# Patient Record
Sex: FEMALE | Race: BLACK OR AFRICAN AMERICAN | Employment: OTHER | URBAN - METROPOLITAN AREA
[De-identification: names, ages, dates, MRNs, and addresses within clinical notes are randomized per-mention and may not be internally consistent; named-entity substitution may affect disease eponyms.]

---

## 2017-11-05 ENCOUNTER — HOSPITAL ENCOUNTER (EMERGENCY)
Age: 42
Discharge: HOME | End: 2017-11-05
Attending: EMERGENCY MEDICINE
Payer: COMMERCIAL

## 2017-11-05 ENCOUNTER — APPOINTMENT (OUTPATIENT)
Dept: GENERAL RADIOLOGY | Age: 42
End: 2017-11-05
Attending: PHYSICIAN ASSISTANT
Payer: COMMERCIAL

## 2017-11-05 VITALS
SYSTOLIC BLOOD PRESSURE: 116 MMHG | DIASTOLIC BLOOD PRESSURE: 73 MMHG | RESPIRATION RATE: 16 BRPM | TEMPERATURE: 98.6 F | HEART RATE: 80 BPM | OXYGEN SATURATION: 100 %

## 2017-11-05 DIAGNOSIS — S16.1XXA STRAIN OF NECK MUSCLE, INITIAL ENCOUNTER: Primary | ICD-10-CM
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
## 2017-11-05 PROCEDURE — 74011250637 HC RX REV CODE- 250/637: Performed by: PHYSICIAN ASSISTANT

## 2017-11-05 PROCEDURE — 72040 X-RAY EXAM NECK SPINE 2-3 VW: CPT

## 2017-11-05 PROCEDURE — 99283 EMERGENCY DEPT VISIT LOW MDM: CPT

## 2017-11-05 RX ORDER — IBUPROFEN 400 MG/1
800 TABLET ORAL
Status: COMPLETED | OUTPATIENT
Start: 2017-11-05 | End: 2017-11-05

## 2017-11-05 RX ORDER — CYCLOBENZAPRINE HCL 10 MG
10 TABLET ORAL
Qty: 12 TAB | Refills: 0 | Status: SHIPPED | OUTPATIENT
Start: 2017-11-05

## 2017-11-05 RX ORDER — NAPROXEN 500 MG/1
500 TABLET ORAL 2 TIMES DAILY WITH MEALS
Qty: 20 TAB | Refills: 0 | Status: SHIPPED | OUTPATIENT
Start: 2017-11-05 | End: 2017-11-15

## 2017-11-05 RX ADMIN — IBUPROFEN 800 MG: 400 TABLET ORAL at 17:22
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
## 2017-11-05 NOTE — DISCHARGE INSTRUCTIONS
Neck Strain: Care Instructions  Your Care Instructions    You have strained the muscles and ligaments in your neck. A sudden, awkward movement can strain the neck. This often occurs with falls or car accidents or during certain sports. Everyday activities like working on a computer or sleeping can also cause neck strain if they force you to hold your neck in an awkward position for a long time. It is common for neck pain to get worse for a day or two after an injury, but it should start to feel better after that. You may have more pain and stiffness for several days before it gets better. This is expected. It may take a few weeks or longer for it to heal completely. Good home treatment can help you get better faster and avoid future neck problems. Follow-up care is a key part of your treatment and safety. Be sure to make and go to all appointments, and call your doctor if you are having problems. It's also a good idea to know your test results and keep a list of the medicines you take. How can you care for yourself at home? · If you were given a neck brace (cervical collar) to limit neck motion, wear it as instructed for as many days as your doctor tells you to. Do not wear it longer than you were told to. Wearing a brace for too long can make neck stiffness worse and weaken the neck muscles. · You can try using heat or ice to see if it helps. ¨ Try using a heating pad on a low or medium setting for 15 to 20 minutes every 2 to 3 hours. Try a warm shower in place of one session with the heating pad. You can also buy single-use heat wraps that last up to 8 hours. ¨ You can also try an ice pack for 10 to 15 minutes every 2 to 3 hours. · Take pain medicines exactly as directed. ¨ If the doctor gave you a prescription medicine for pain, take it as prescribed. ¨ If you are not taking a prescription pain medicine, ask your doctor if you can take an over-the-counter medicine.   · Gently rub the area to relieve pain and help with blood flow. Do not massage the area if it hurts to do so. · Do not do anything that makes the pain worse. Take it easy for a couple of days. You can do your usual activities if they do not hurt your neck or put it at risk for more stress or injury. · Try sleeping on a special neck pillow. Place it under your neck, not under your head. Placing a tightly rolled-up towel under your neck while you sleep will also work. If you use a neck pillow or rolled towel, do not use your regular pillow at the same time. · To prevent future neck pain, do exercises to stretch and strengthen your neck and back. Learn how to use good posture, safe lifting techniques, and proper body mechanics. When should you call for help? Call 911 anytime you think you may need emergency care. For example, call if:  ? · You are unable to move an arm or a leg at all. ?Call your doctor now or seek immediate medical care if:  ? · You have new or worse symptoms in your arms, legs, chest, belly, or buttocks. Symptoms may include:  ¨ Numbness or tingling. ¨ Weakness. ¨ Pain. ? · You lose bladder or bowel control. ? Watch closely for changes in your health, and be sure to contact your doctor if:  ? · You are not getting better as expected. Where can you learn more? Go to http://martine-azar.info/. Enter M253 in the search box to learn more about \"Neck Strain: Care Instructions. \"  Current as of: March 21, 2017  Content Version: 11.4  © 2973-7417 [employer]. Care instructions adapted under license by [employer] (which disclaims liability or warranty for this information). If you have questions about a medical condition or this instruction, always ask your healthcare professional. [address] any warranty or liability for your use of this information.

## 2017-11-16 ENCOUNTER — OFFICE VISIT (OUTPATIENT)
Dept: FAMILY MEDICINE CLINIC | Age: 42
End: 2017-11-16

## 2017-11-16 VITALS
OXYGEN SATURATION: 98 % | DIASTOLIC BLOOD PRESSURE: 68 MMHG | HEART RATE: 78 BPM | SYSTOLIC BLOOD PRESSURE: 108 MMHG | HEIGHT: 63 IN | TEMPERATURE: 98.1 F | BODY MASS INDEX: 29.3 KG/M2 | RESPIRATION RATE: 14 BRPM | WEIGHT: 165.4 LBS

## 2017-11-16 DIAGNOSIS — M54.50 ACUTE BILATERAL LOW BACK PAIN WITHOUT SCIATICA: ICD-10-CM

## 2017-11-16 DIAGNOSIS — M54.2 NECK PAIN: Primary | ICD-10-CM

## 2017-11-16 RX ORDER — NAPROXEN 500 MG/1
500 TABLET ORAL 2 TIMES DAILY WITH MEALS
COMMUNITY

## 2017-11-16 NOTE — PATIENT INSTRUCTIONS
Healthy Upper Back: Exercises  Your Care Instructions  Here are some examples of exercises for your upper back. Start each exercise slowly. Ease off the exercise if you start to have pain. Your doctor or physical therapist will tell you when you can start these exercises and which ones will work best for you. How to do the exercises  Lower neck and upper back stretch    1. Stretch your arms out in front of your body. Clasp one hand on top of your other hand. 2. Gently reach out so that you feel your shoulder blades stretching away from each other. 3. Gently bend your head forward. 4. Hold for 15 to 30 seconds. 5. Repeat 2 to 4 times. Midback stretch    If you have knee pain, do not do this exercise. 1. Kneel on the floor, and sit back on your ankles. 2. Lean forward, place your hands on the floor, and stretch your arms out in front of you. Rest your head between your arms. 3. Gently push your chest toward the floor, reaching as far in front of you as possible. 4. Hold for 15 to 30 seconds. 5. Repeat 2 to 4 times. Shoulder rolls    1. Sit comfortably with your feet shoulder-width apart. You can also do this exercise while standing. 2. Roll your shoulders up, then back, and then down in a smooth, circular motion. 3. Repeat 2 to 4 times. Wall push-up    1. Stand against a wall with your feet about 12 to 24 inches back from the wall. If you feel any pain when you do this exercise, stand closer to the wall. 2. Place your hands on the wall slightly wider apart than your shoulders, and lean forward. 3. Gently lean your body toward the wall. Then push back to your starting position. Keep the motion smooth and controlled. 4. Repeat 8 to 12 times. Resisted shoulder blade squeeze    For this exercise, you will need elastic exercise material, such as surgical tubing or Thera-Band. 1. Sit or stand, holding the band in both hands in front of you.  Keep your elbows close to your sides, bent at a 90-degree angle. Your palms should face up. 2. Squeeze your shoulder blades together, and move your arms to the outside, stretching the band. Be sure to keep your elbows at your sides while you do this. 3. Relax. 4. Repeat 8 to 12 times. Resisted rows    For this exercise, you will need elastic exercise material, such as surgical tubing or Thera-Band. 1. Put the band around a solid object, such as a bedpost, at about waist level. Hold one end of the band in each hand. 2. With your elbows at your sides and bent to 90 degrees, pull the band back to move your shoulder blades toward each other. Return to the starting position. 3. Repeat 8 to 12 times. Follow-up care is a key part of your treatment and safety. Be sure to make and go to all appointments, and call your doctor if you are having problems. It's also a good idea to know your test results and keep a list of the medicines you take. Where can you learn more? Go to http://martine-azar.info/. Enter J818 in the search box to learn more about \"Healthy Upper Back: Exercises. \"  Current as of: March 21, 2017  Content Version: 11.4  © 2563-9491 [employer]. Care instructions adapted under license by [employer] (which disclaims liability or warranty for this information). If you have questions about a medical condition or this instruction, always ask your healthcare professional. [place] [address] any warranty or liability for your use of this information. Body Mass Index: Care Instructions  Your Care Instructions    Body mass index (BMI) can help you see if your weight is raising your risk for health problems. It uses a formula to compare how much you weigh with how tall you are. · A BMI lower than 18.5 is considered underweight. · A BMI between 18.5 and 24.9 is considered healthy. · A BMI between 25 and 29.9 is considered overweight. A BMI of 30 or higher is considered obese.   If your BMI is in the normal range, it means that you have a lower risk for weight-related health problems. If your BMI is in the overweight or obese range, you may be at increased risk for weight-related health problems, such as high blood pressure, heart disease, stroke, arthritis or joint pain, and diabetes. If your BMI is in the underweight range, you may be at increased risk for health problems such as fatigue, lower protection (immunity) against illness, muscle loss, bone loss, hair loss, and hormone problems. BMI is just one measure of your risk for weight-related health problems. You may be at higher risk for health problems if you are not active, you eat an unhealthy diet, or you drink too much alcohol or use tobacco products. Follow-up care is a key part of your treatment and safety. Be sure to make and go to all appointments, and call your doctor if you are having problems. It's also a good idea to know your test results and keep a list of the medicines you take. How can you care for yourself at home? · Practice healthy eating habits. This includes eating plenty of fruits, vegetables, whole grains, lean protein, and low-fat dairy. · If your doctor recommends it, get more exercise. Walking is a good choice. Bit by bit, increase the amount you walk every day. Try for at least 30 minutes on most days of the week. · Do not smoke. Smoking can increase your risk for health problems. If you need help quitting, talk to your doctor about stop-smoking programs and medicines. These can increase your chances of quitting for good. · Limit alcohol to 2 drinks a day for men and 1 drink a day for women. Too much alcohol can cause health problems. If you have a BMI higher than 25  · Your doctor may do other tests to check your risk for weight-related health problems.  This may include measuring the distance around your waist. A waist measurement of more than 40 inches in men or 35 inches in women can increase the risk of weight-related health problems. · Talk with your doctor about steps you can take to stay healthy or improve your health. You may need to make lifestyle changes to lose weight and stay healthy, such as changing your diet and getting regular exercise. If you have a BMI lower than 18.5  · Your doctor may do other tests to check your risk for health problems. · Talk with your doctor about steps you can take to stay healthy or improve your health. You may need to make lifestyle changes to gain or maintain weight and stay healthy, such as getting more healthy foods in your diet and doing exercises to build muscle. Where can you learn more? Go to http://martine-azar.info/. Enter S176 in the search box to learn more about \"Body Mass Index: Care Instructions. \"  Current as of: October 13, 2016  Content Version: 11.4  © 2655-4405 [employer]. Care instructions adapted under license by [employer] (which disclaims liability or warranty for this information). If you have questions about a medical condition or this instruction, always ask your healthcare professional. [address] any warranty or liability for your use of this information.
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
## 2017-11-16 NOTE — MR AVS SNAPSHOT
Visit Information Date & Time Provider Department Dept. Phone Encounter #  
 11/16/2017  2:15 PM Kalyan Fabian [facility] [phone] [id number] Follow-up Instructions Return if symptoms worsen or fail to improve. Upcoming Health Maintenance Date Due DTaP/Tdap/Td series (1 - Tdap) 1/29/1997 PAP AKA CERVICAL CYTOLOGY 1/29/1997 Influenza Age 5 to Adult 8/1/2017 Allergies as of 11/16/2017  Review Complete On: 11/16/2017 By: Kalyan Fabian MD  
 No Known Allergies Current Immunizations  Never Reviewed No immunizations on file. Not reviewed this visit You Were Diagnosed With   
  
 Codes Comments Neck pain    -  Primary ICD-10-CM: M54.2 ICD-9-CM: 723.1 Acute bilateral low back pain without sciatica     ICD-10-CM: M54.5 ICD-9-CM: 724.2, 338.19 Vitals BP Pulse Temp Resp Height(growth percentile) Weight(growth percentile) 108/68 (BP 1 Location: Left arm, BP Patient Position: Sitting) 78 98.1 °F (36.7 °C) (Oral) 14 5' 3\" (1.6 m) 165 lb 6.4 oz (75 kg) LMP SpO2 BMI OB Status Smoking Status 07/31/2015 98% 29.3 kg/m2 Hysterectomy Never Smoker Vitals History BMI and BSA Data Body Mass Index Body Surface Area  
 29.3 kg/m 2 1.83 m 2 Preferred Pharmacy Pharmacy Name Phone [facility] [address] [place] [address] [phone] Your Updated Medication List  
  
   
This list is accurate as of: 11/16/17  3:04 PM.  Always use your most recent med list.  
  
  
  
  
 cyclobenzaprine 10 mg tablet Commonly known as:  FLEXERIL Take 1 Tab by mouth three (3) times daily as needed for Muscle Spasm(s). naproxen 500 mg tablet Commonly known as:  NAPROSYN Take 500 mg by mouth two (2) times daily (with meals). We Performed the Following REFERRAL TO PHYSICAL THERAPY [FZE45 Custom] Comments: Please evaluate patient for neck and back pain Follow-up Instructions Return if symptoms worsen or fail to improve. Referral Information Referral ID Referred By Referred To  
  
 [id number] GUME Tracy Not Available Visits Status Start Date End Date 1 New Request 11/16/17 11/16/18 If your referral has a status of pending review or denied, additional information will be sent to support the outcome of this decision. Patient Instructions Healthy Upper Back: Exercises Your Care Instructions Here are some examples of exercises for your upper back. Start each exercise slowly. Ease off the exercise if you start to have pain. Your doctor or physical therapist will tell you when you can start these exercises and which ones will work best for you. How to do the exercises Lower neck and upper back stretch 1. Stretch your arms out in front of your body. Clasp one hand on top of your other hand. 2. Gently reach out so that you feel your shoulder blades stretching away from each other. 3. Gently bend your head forward. 4. Hold for 15 to 30 seconds. 5. Repeat 2 to 4 times. Midback stretch If you have knee pain, do not do this exercise. 1. Kneel on the floor, and sit back on your ankles. 2. Lean forward, place your hands on the floor, and stretch your arms out in front of you. Rest your head between your arms. 3. Gently push your chest toward the floor, reaching as far in front of you as possible. 4. Hold for 15 to 30 seconds. 5. Repeat 2 to 4 times. Shoulder rolls 1. Sit comfortably with your feet shoulder-width apart. You can also do this exercise while standing. 2. Roll your shoulders up, then back, and then down in a smooth, circular motion. 3. Repeat 2 to 4 times. Wall push-up 1. Stand against a wall with your feet about 12 to 24 inches back from the wall. If you feel any pain when you do this exercise, stand closer to the wall. 2. Place your hands on the wall slightly wider apart than your shoulders, and lean forward. 3. Gently lean your body toward the wall. Then push back to your starting position. Keep the motion smooth and controlled. 4. Repeat 8 to 12 times. Resisted shoulder blade squeeze For this exercise, you will need elastic exercise material, such as surgical tubing or Thera-Band. 1. Sit or stand, holding the band in both hands in front of you. Keep your elbows close to your sides, bent at a 90-degree angle. Your palms should face up. 2. Squeeze your shoulder blades together, and move your arms to the outside, stretching the band. Be sure to keep your elbows at your sides while you do this. 3. Relax. 4. Repeat 8 to 12 times. Resisted rows For this exercise, you will need elastic exercise material, such as surgical tubing or Thera-Band. 1. Put the band around a solid object, such as a bedpost, at about waist level. Hold one end of the band in each hand. 2. With your elbows at your sides and bent to 90 degrees, pull the band back to move your shoulder blades toward each other. Return to the starting position. 3. Repeat 8 to 12 times. Follow-up care is a key part of your treatment and safety. Be sure to make and go to all appointments, and call your doctor if you are having problems. It's also a good idea to know your test results and keep a list of the medicines you take. Where can you learn more? Go to http://martine-azar.info/. Enter U226 in the search box to learn more about \"Healthy Upper Back: Exercises. \" Current as of: March 21, 2017 Content Version: 11.4 © 4221-4284 [employer]. Care instructions adapted under license by [employer] (which disclaims liability or warranty for this information).  If you have questions about a medical condition or this instruction, always ask your healthcare professional. [employer] disclaims any warranty or liability for your use of this information. Introducing [facility]! [facility] introduces HobbyTalk patient portal. Now you can access parts of your medical record, email your doctor's office, and request medication refills online. 1. In your internet browser, go to [email or web address] 2. Click on the First Time User? Click Here link in the Sign In box. You will see the New Member Sign Up page. 3. Enter your HobbyTalk Access Code exactly as it appears below. You will not need to use this code after youve completed the sign-up process. If you do not sign up before the expiration date, you must request a new code. · HobbyTalk Access Code: [email or web address] Expires: 2/3/2018  5:59 PM 
 
4. Enter the last four digits of your Social Security Number (xxxx) and Date of Birth (mm/dd/yyyy) as indicated and click Submit. You will be taken to the next sign-up page. 5. Create a HobbyTalk ID. This will be your HobbyTalk login ID and cannot be changed, so think of one that is secure and easy to remember. 6. Create a HobbyTalk password. You can change your password at any time. 7. Enter your Password Reset Question and Answer. This can be used at a later time if you forget your password. 8. Enter your e-mail address. You will receive e-mail notification when new information is available in [address] 9. Click Sign Up. You can now view and download portions of your medical record. 10. Click the Download Summary menu link to download a portable copy of your medical information. If you have questions, please visit the Frequently Asked Questions section of the HobbyTalk website. Remember, HobbyTalk is NOT to be used for urgent needs. For medical emergencies, dial 911. Now available from your iPhone and Android! Please provide this summary of care documentation to your next provider. Your primary care clinician is listed as Phys Other. If you have any questions after today's visit, please call [phone].

## 2017-11-28 ENCOUNTER — HOSPITAL ENCOUNTER (OUTPATIENT)
Dept: PHYSICAL THERAPY | Age: 42
Discharge: HOME | End: 2017-11-28
Payer: COMMERCIAL

## 2017-11-28 PROCEDURE — 97162 PT EVAL MOD COMPLEX 30 MIN: CPT

## 2017-11-28 PROCEDURE — 97110 THERAPEUTIC EXERCISES: CPT
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
## 2017-11-30 ENCOUNTER — APPOINTMENT (OUTPATIENT)
Dept: PHYSICAL THERAPY | Age: 42
End: 2017-11-30

## 2017-12-04 ENCOUNTER — HOSPITAL ENCOUNTER (OUTPATIENT)
Dept: PHYSICAL THERAPY | Age: 42
Discharge: HOME | End: 2017-12-04
Payer: COMMERCIAL

## 2017-12-04 PROCEDURE — 97140 MANUAL THERAPY 1/> REGIONS: CPT

## 2017-12-04 PROCEDURE — 97110 THERAPEUTIC EXERCISES: CPT

## 2017-12-04 NOTE — PROGRESS NOTES
PT DAILY TREATMENT NOTE - [facility]     Patient Name: Temo Tran  Date:2017  : 1975  [x]  Patient  Verified  Payor: Jenniffer Buchanan / Plan: Monica Shen / Product Type: Commerical /    In time: 9:40   Out time: 10:29  Total Treatment Time (min): 49  Visit #: 2 of 10    Treatment Area: Neck pain [M54.2]    SUBJECTIVE  Pain Level (0-10 scale): 5  Any medication changes, allergies to medications, adverse drug reactions, diagnosis change, or new procedure performed?: [x] No    [] Yes (see summary sheet for update)  Subjective functional status/changes:   [] No changes reported  Reports attempting the HEP, otherwise no changes.     OBJECTIVE    Modality rationale: decrease pain and increase tissue extensibility to improve the patients ability to tolerate functional tasks    Min Type Additional Details    [] Estim:  []Unatt       []IFC  []Premod                        []Other:  []w/ice   []w/heat  Position:  Location:    [] Estim: []Att    []TENS instruct  []NMES                    []Other:  []w/US   []w/ice   []w/heat  Position:  Location:    []  Traction: [] Cervical       []Lumbar                       [] Prone          []Supine                       []Intermittent   []Continuous Lbs:  [] before manual  [] after manual    []  Ultrasound: []Continuous   [] Pulsed                           []1MHz   []3MHz Location:  W/cm2:    []  Iontophoresis with dexamethasone         Location: [] Take home patch   [] In clinic   10 []  Ice     [x]  heat  []  Ice massage  []  Laser   []  Anodyne Position: sitting  Location: neck    []  Laser with stim  []  Other: Position:  Location:    []  Vasopneumatic Device Pressure:       [] lo [] med [] hi   Temperature: [] lo [] med [] hi   [] Skin assessment post-treatment:  []intact []redness- no adverse reaction    []redness  adverse reaction:     29 min Therapeutic Exercise:  [x] See flow sheet :    Rationale: increase ROM and increase strength to improve the patients ability to tolerate ADLs    10 min Manual Therapy: PAs c/s grade 1-2 C3, TPR B c/s paraspinals, DTM right UT/scalenes, gentle manual c/s distraction for stretching   Rationale: decrease pain, increase ROM, increase tissue extensibility and decrease trigger points to improve activity tolerance. With   [] TE   [] TA   [] neuro   [] other: Patient Education: [x] Review HEP    [] Progressed/Changed HEP based on:   [] positioning   [] body mechanics   [] transfers   [] heat/ice application    [] other:      Other Objective/Functional Measures: Initiated exercises/interventions in flow sheet. Pain Level (0-10 scale) post treatment: 0 \"sore\"    ASSESSMENT/Changes in Function: Reported no pain post session. Decreased mobility noted at C3 and reported pain to the right c/s paraspinals in this region as well. Reported improvement in muscle tightness in the right neck and UT/scalenes region post manual. Continue POC as tolerated. Patient will continue to benefit from skilled PT services to modify and progress therapeutic interventions, address functional mobility deficits, address ROM deficits, address strength deficits, analyze and address soft tissue restrictions, analyze and cue movement patterns, analyze and modify body mechanics/ergonomics, assess and modify postural abnormalities and instruct in home and community integration to attain remaining goals. []  See Plan of Care  []  See progress note/recertification  []  See Discharge Summary         Progress towards goals / Updated goals:  Short Term Goals: To be accomplished in 2 weeks:  1. Pt will report compliance and independence to HEP to help the pt manage their pain and symptoms. Eval: established     Current: reports attempting HEP 12/4/2017          Long Term Goals: To be accomplished in 5 weeks:  1. Pt will increase FOTO score to 72 points to improve ability to perform ADLs. Eval: 59 points  2.  Pt will report a decrease in at worst pain to 5/10 to improve tolerance to work tasks. Eval: 8/10 at worst   3. Pt will increase AROM c/s flex to 35 degs, EXT to 40 degs, B SB to 25 degs, right rotation to 60 degs, left rotation to WNLs to improve ability to perform household activities with less pain. Eval: flex 22 degs with pain on right neck, EXT 28 degs with tightness right skull region, right SB 19 degs with tightness right skull region, left SB 21 degs, right rotation 48 degs with pain in the right neck, left rotation 62 degs. 4. Pt will report being able to look down for 10 mins with minimal to no increased neck pain to improve ability to perform work activities with less pain.   Eval: ~ 5 mins tolerance    PLAN  [x]  Upgrade activities as tolerated     [x]  Continue plan of care  [x]  Update interventions per flow sheet       []  Discharge due to:_  []  Other:_      Salazar Starkey PT 12/4/2017  9:51 AM    Future Appointments  Date Time Provider Hannah Palencia   12/4/2017 9:30 AM Salazar Starkey PT [facility]   12/7/2017 10:00 AM ANA Flores   12/11/2017 10:00 AM Ashutosh Corbett PTA [facility]   12/14/2017 10:30 AM ANA Flores

## 2017-12-07 ENCOUNTER — APPOINTMENT (OUTPATIENT)
Dept: PHYSICAL THERAPY | Age: 42
End: 2017-12-07
Payer: COMMERCIAL

## 2017-12-11 ENCOUNTER — APPOINTMENT (OUTPATIENT)
Dept: PHYSICAL THERAPY | Age: 42
End: 2017-12-11
Payer: COMMERCIAL

## 2017-12-13 ENCOUNTER — APPOINTMENT (OUTPATIENT)
Dept: PHYSICAL THERAPY | Age: 42
End: 2017-12-13
Payer: COMMERCIAL

## 2017-12-14 ENCOUNTER — APPOINTMENT (OUTPATIENT)
Dept: PHYSICAL THERAPY | Age: 42
End: 2017-12-14
Payer: COMMERCIAL

## 2018-01-02 ENCOUNTER — HOSPITAL ENCOUNTER (OUTPATIENT)
Dept: PHYSICAL THERAPY | Age: 43
Discharge: HOME | End: 2018-01-02
Payer: COMMERCIAL

## 2018-01-02 PROCEDURE — 97110 THERAPEUTIC EXERCISES: CPT

## 2018-01-02 PROCEDURE — 97140 MANUAL THERAPY 1/> REGIONS: CPT

## 2018-01-02 NOTE — PROGRESS NOTES
[facility] at [address] [facility] [address] [facility]  [address]  Phone: [phone]      Fax:  [phone]    Progress Note  Patient name: Reese Estrella Start of Care: 2017   Referral source: Ashton Spurling, MD : 1975                         Medical Diagnosis: Neck pain [M54.2] Onset Date: 2017                         Treatment Diagnosis: neck and back pain   Prior Hospitalization: see medical history Provider#: [id number]   Medications: Verified on Patient summary List    Comorbidities: none   Prior Level of Function: Independent with ADls, functional and work tasks with no pain in the back and neck. Visits from Start of Care: 2    Missed Visits: 0    Established Goals:          Excellent Good         Limited           None  [x] Increased ROM   []  [x]  []  []  [] Increased Strength  []  []  []  []  [x] Increased Mobility  []  []  [x]  []   [x] Decreased Pain   []  [x]  []  []  [] Decreased Swelling  []  []  []  []    Key Functional Changes:   Functional Gains: overall pain, ROM, some improvement with looking down, overall stiffness/achyness, pain in the back  Functional Deficits: still have pain/stiffness in the neck  Pain                   Best: 1-210                          Worst: 10    Current goals:  1. Pt will report compliance and independence to HEP to help the pt manage their pain and symptoms.             Eval: established     Current: reports attempting HEP 2017          Long Term Goals: To be accomplished in 5 weeks:  1. Pt will increase FOTO score to 72 points to improve ability to perform ADLs. Eval: 59 points  Current: progressing, 63 points 2018  2. Pt will report a decrease in at worst pain to 5/10 to improve tolerance to work tasks. Eval: 8/10 at worst   Current: MET 4/10 at worst 2018  3.  Pt will increase AROM c/s flex to 35 degs, EXT to 40 degs, B SB to 25 degs, right rotation to 60 degs, left rotation to WNLs to improve ability to perform household activities with less pain. Eval: flex 22 degs with pain on right neck, EXT 28 degs with tightness right skull region, right SB 19 degs with tightness right skull region, left SB 21 degs, right rotation 48 degs with pain in the right neck, left rotation 62 degs. Current: progressing, flex 25 degs with pulling at the back of the skull, EXT 32 degs, right SB 24 degs with stretching on the left neck, left SB 23 degs with tightness on the right neck, right rotation 64 degs with pulling in the right neck/jaw, left rotation WNL with tightness on the right neck 1/2/2018. 4. Pt will report being able to look down for 10 mins with minimal to no increased neck pain to improve ability to perform work activities with less pain. 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 Pt will increase FOTO score to 72 points to improve ability to perform ADLs. PN: progressing, 63 points   2. Pt will report a decrease in at at best pain in the neck to 0/10 to improve tolerance to work tasks. PN: 1-2/10 at best  3. Pt will increase AROM c/s flex to 35 degs, EXT to 40 degs, B SB to 25 degs, right rotation to [facility] improve ability to perform household activities with less pain. PN: flex 25 degs with pulling at the back of the skull, EXT 32 degs, right SB 24 degs with stretching on the left neck, left SB 23 degs with tightness on the right neck, right rotation 64 degs with pulling in the right neck/jaw  4. Pt will report being able to look down for 10 mins with minimal to no increased neck pain to improve ability to perform work activities with less pain.   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 Pt reports that her back pain is not an issue at this time and her current pain is mostly in her neck region. We will continue therapy at this time to further improve activity tolerance and stiffness/pain to help the pt perform functional and work tasks with more ease. [x]Continue therapy per initial plan/protocol at a frequency of  2 x per week for 5 weeks  []Continue therapy with the following recommended changes:_____________________      _____________________________________________________________________  []Discontinue therapy progressing towards or have reached established goals  []Discontinue therapy due to lack of appreciable progress towards goals  []Discontinue therapy due to lack of attendance or compliance  []Await Physician's recommendations/decisions regarding therapy  []Other:________________________________________________________________    Thank you for this referral.   Cecilia Sweeney, PT 1/2/2018 10:59 AM  NOTE TO PHYSICIAN:  [address]   FAX TO [facility]: [phone]  If you are unable to process this request in 24 hours please contact our office:   [phone]  []  I have read the above report and request that my patient continue as recommended. []  I have read the above report and request that my patient continue therapy with the following changes/special instructions:________________________________________  []I have read the above report and request that my patient be discharged from therapy.     Physicians signature: ______________________________Date: ______Time:______

## 2018-01-02 NOTE — PROGRESS NOTES
PT DAILY TREATMENT NOTE - [facility]     Patient Name: Candi Goss  Date:2018  : 1975  [x]  Patient  Verified  Payor: /    In time: 9:41    Out time: 10:42  Total Treatment Time (min): 64  Visit #: 3 of 10    Treatment Area: Neck pain [M54.2]    SUBJECTIVE  Pain Level (0-10 scale): 3-4  Any medication changes, allergies to medications, adverse drug reactions, diagnosis change, or new procedure performed?: [x] No    [] Yes (see summary sheet for update)  Subjective functional status/changes:   [x] No changes reported  Pt reports seeing improvements in her overall pain and symptoms since starting therapy. Pt reports that she continues to have pain in the neck region but her back is feeling better and has no deficits with her back. Pt returns to therapy after missing ~ 4 weeks secondary to work schedule and being out of town.      OBJECTIVE    Modality rationale: decrease pain and increase tissue extensibility to improve the patients ability to tolerate functional tasks    Min Type Additional Details    [] Estim:  []Unatt       []IFC  []Premod                        []Other:  []w/ice   []w/heat  Position:  Location:    [] Estim: []Att    []TENS instruct  []NMES                    []Other:  []w/US   []w/ice   []w/heat  Position:  Location:    []  Traction: [] Cervical       []Lumbar                       [] Prone          []Supine                       []Intermittent   []Continuous Lbs:  [] before manual  [] after manual    []  Ultrasound: []Continuous   [] Pulsed                           []1MHz   []3MHz Location:  W/cm2:    []  Iontophoresis with dexamethasone         Location: [] Take home patch   [] In clinic   10 []  Ice     [x]  heat  []  Ice massage  []  Laser   []  Anodyne Position: sitting  Location: neck    []  Laser with stim  []  Other: Position:  Location:    []  Vasopneumatic Device Pressure:       [] lo [] med [] hi   Temperature: [] lo [] med [] hi   [] Skin assessment post-treatment: []intact []redness- no adverse reaction    []redness  adverse reaction:     33 min Therapeutic Exercise:  [x] See flow sheet : exercises, goal reassessment   Rationale: increase ROM and increase strength to improve the patients ability to tolerate ADLs    18 min Manual Therapy: PAs c/s grade 1-2 throughout c/s, TPR/DTM B c/s paraspinals, DTM right UT/scalenes, gentle manual c/s distraction for stretching   Rationale: decrease pain, increase ROM, increase tissue extensibility and decrease trigger points to improve activity tolerance. With   [] TE   [] TA   [] neuro   [] other: Patient Education: [x] Review HEP    [] Progressed/Changed HEP based on:   [] positioning   [] body mechanics   [] transfers   [] heat/ice application    [] other:      Other Objective/Functional Measures: See goals below. Functional Gains: overall pain, ROM, some improvement with looking down, overall stiffness/achyness, pain in the back  Functional Deficits: still have pain/stiffness in the neck  Pain        Best: 1-2/10     Worst: 4/10    Pain Level (0-10 scale) post treatment: 0    ASSESSMENT/Changes in Function: See PN. Reported no pain post session. Pt reports/demonstrates improvements in pain, ROM, stiffness, and mobility since starting therapy. Pt reports that her back pain is not an issue at this time and her current pain is mostly in her neck region. We will continue therapy at this time to further improve activity tolerance and stiffness/pain to help the pt perform functional and work tasks with more ease.      Patient will continue to benefit from skilled PT services to modify and progress therapeutic interventions, address functional mobility deficits, address ROM deficits, address strength deficits, analyze and address soft tissue restrictions, analyze and cue movement patterns, analyze and modify body mechanics/ergonomics, assess and modify postural abnormalities and instruct in home and community integration to attain remaining goals. []  See Plan of Care  [x]  See progress note/recertification  []  See Discharge Summary         Progress towards goals / Updated goals:  Short Term Goals: To be accomplished in 2 weeks:  1. Pt will report compliance and independence to HEP to help the pt manage their pain and symptoms. Eval: established     Current: reports attempting HEP 12/4/2017          Long Term Goals: To be accomplished in 5 weeks:  1. Pt will increase FOTO score to 72 points to improve ability to perform ADLs. Eval: 59 points  Current: progressing, 63 points 1/2/2018  2. Pt will report a decrease in at worst pain to 5/10 to improve tolerance to work tasks. Eval: 8/10 at worst   Current: MET 4/10 at worst 1/2/2018  3. Pt will increase AROM c/s flex to 35 degs, EXT to 40 degs, B SB to 25 degs, right rotation to 60 degs, left rotation to WNLs to improve ability to perform household activities with less pain. Eval: flex 22 degs with pain on right neck, EXT 28 degs with tightness right skull region, right SB 19 degs with tightness right skull region, left SB 21 degs, right rotation 48 degs with pain in the right neck, left rotation 62 degs. Current: progressing, flex 25 degs with pulling at the back of the skull, EXT 32 degs, right SB 24 degs with stretching on the left neck, left SB 23 degs with tightness on the right neck, right rotation 64 degs with pulling in the right neck/jaw, left rotation WNL with tightness on the right neck 1/2/2018. 4. Pt will report being able to look down for 10 mins with minimal to no increased neck pain to improve ability to perform work activities with less pain.   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    PLAN  [x]  Upgrade activities as tolerated     [x]  Continue plan of care  [x]  Update interventions per flow sheet       []  Discharge due to:_  []  Other:_      Holden Gilbert Alexandra Tapia, PT 1/2/2018  10:20 AM    Future Appointments  Date Time Provider Hannah Palencia   1/2/2018 9:30 AM Sanya Garrett, ANA [facility]   1/4/2018 10:00 AM Sanya Garrett PT [facility]   1/9/2018 9:30 AM Freddy Wilkinson PTA [facility]   1/12/2018 9:00 AM [facility] PT [facility]INO TORREZ [address]

## 2018-01-04 ENCOUNTER — APPOINTMENT (OUTPATIENT)
Dept: PHYSICAL THERAPY | Age: 43
End: 2018-01-04
Payer: COMMERCIAL

## 2018-01-05 ENCOUNTER — APPOINTMENT (OUTPATIENT)
Dept: PHYSICAL THERAPY | Age: 43
End: 2018-01-05
Payer: COMMERCIAL

## 2018-01-09 ENCOUNTER — HOSPITAL ENCOUNTER (OUTPATIENT)
Dept: PHYSICAL THERAPY | Age: 43
Discharge: HOME | End: 2018-01-09
Payer: COMMERCIAL

## 2018-01-09 PROCEDURE — 97140 MANUAL THERAPY 1/> REGIONS: CPT

## 2018-01-09 PROCEDURE — 97110 THERAPEUTIC EXERCISES: CPT

## 2018-01-09 NOTE — PROGRESS NOTES
PT DAILY TREATMENT NOTE     Patient Name: Allison Torre  Date:2018  : 1975  [x]  Patient  Verified  Payor: /    In time:9:40  Out time:10:20  Total Treatment Time (min): 40  Visit #: 1  10    Treatment Area: Neck pain [M54.2]    SUBJECTIVE  Pain Level (0-10 scale): 2-3/10  Any medication changes, allergies to medications, adverse drug reactions, diagnosis change, or new procedure performed?: [x] No    [] Yes (see summary sheet for update)  Subjective functional status/changes:   [x] No changes reported      OBJECTIVE    Modality rationale: decrease pain and increase tissue extensibility to improve the patients ability to perform ADls without difficulty. Min Type Additional Details    [] Estim:  []Unatt       []IFC  []Premod                        []Other:  []w/ice   []w/heat  Position:  Location:    [] Estim: []Att    []TENS instruct  []NMES                    []Other:  []w/US   []w/ice   []w/heat  Position:  Location:    []  Traction: [] Cervical       []Lumbar                       [] Prone          []Supine                       []Intermittent   []Continuous Lbs:  [] before manual  [] after manual    []  Ultrasound: []Continuous   [] Pulsed                           []1MHz   []3MHz W/cm2:  Location:    []  Iontophoresis with dexamethasone         Location: [] Take home patch   [] In clinic   10 []  Ice     [x]  heat  []  Ice massage  []  Laser   []  Anodyne Position:seated  Location:neck    []  Laser with stim  []  Other:  Position:  Location:    []  Vasopneumatic Device Pressure:       [] lo [] med [] hi   Temperature: [] lo [] med [] hi   [x] Skin assessment post-treatment:  [x]intact [x]redness- no adverse reaction    []redness  adverse reaction:     20 min Therapeutic Exercise:  [] See flow sheet :   Rationale: increase ROM and increase strength to improve the patients ability to perform ADLs without difficulty.      10 min Manual Therapy:  SOR,STM and TrP to B UTs in seated position. Rationale: decrease pain, increase ROM, increase tissue extensibility and decrease trigger points to perform ADLs without difficulty. With   [] TE   [] TA   [] neuro   [] other: Patient Education: [x] Review HEP    [] Progressed/Changed HEP based on:   [] positioning   [] body mechanics   [] transfers   [] heat/ice application    [] other:      Other Objective/Functional Measures:      Pain Level (0-10 scale) post treatment: 0/10    ASSESSMENT/Changes in Function: Continued with ex. Per flow sheet. No p! Was reported after ex. Today. Patient will continue to benefit from skilled PT services to modify and progress therapeutic interventions, address functional mobility deficits, address ROM deficits, address strength deficits, analyze and address soft tissue restrictions and analyze and cue movement patterns to attain remaining goals. []  See Plan of Care  []  See progress note/recertification  []  See Discharge Summary         Progress towards goals / Updated goals:  1. Pt will increase FOTO score to 72 points to improve ability to perform ADLs. PN: progressing, 61 points   2. Pt will report a decrease in at at best pain in the neck to 0/10 to improve tolerance to work tasks. PN: 1-2/10 at best  3. Pt will increase AROM c/s flex to 35 degs, EXT to 40 degs, B SB to 25 degs, right rotation to [facility] improve ability to perform household activities with less pain. PN: flex 25 degs with pulling at the back of the skull, EXT 32 degs, right SB 24 degs with stretching on the left neck, left SB 23 degs with tightness on the right neck, right rotation 64 degs with pulling in the right neck/jaw  4. Pt will report being able to look down for 10 mins with minimal to no increased neck pain to improve ability to perform work activities with less pain.   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

## 2018-01-12 ENCOUNTER — APPOINTMENT (OUTPATIENT)
Dept: PHYSICAL THERAPY | Age: 43
End: 2018-01-12
Payer: COMMERCIAL

## 2018-01-16 ENCOUNTER — HOSPITAL ENCOUNTER (OUTPATIENT)
Dept: PHYSICAL THERAPY | Age: 43
Discharge: HOME | End: 2018-01-16
Payer: COMMERCIAL

## 2018-01-16 PROCEDURE — 97530 THERAPEUTIC ACTIVITIES: CPT | Performed by: PHYSICAL THERAPIST

## 2018-01-16 PROCEDURE — 97140 MANUAL THERAPY 1/> REGIONS: CPT | Performed by: PHYSICAL THERAPIST

## 2018-01-16 PROCEDURE — 97112 NEUROMUSCULAR REEDUCATION: CPT | Performed by: PHYSICAL THERAPIST

## 2018-01-16 PROCEDURE — 97110 THERAPEUTIC EXERCISES: CPT | Performed by: PHYSICAL THERAPIST

## 2018-01-16 NOTE — PROGRESS NOTES
PT DAILY TREATMENT NOTE     Patient Name: Jamar Wright  Date:2018  : 1975  -  Patient  Verified  Payor: Kimberly Salgado / Plan: Carry Indra Chiquitamerly 77 PPO / Product Type: Commerical /    In time:9:37  Out time:10:55  Total Treatment Time (min): 65  Visit #: 2 of 10    Treatment Area: Neck pain [M54.2]    SUBJECTIVE  Pain Level (0-10 scale): 1-210  Any medication changes, allergies to medications, adverse drug reactions, diagnosis change, or new procedure performed?: - No    - Yes (see summary sheet for update)  Subjective functional status/changes:   - No changes reported  Patieent reports that her neck is feeling better. Still has some stiffness. She also reports that she will be moving to [facility] late next week. OBJECTIVE      20 min Therapeutic Exercise:  - See flow sheet :   Rationale: increase ROM and increase strength to improve the patients ability to return to her normal functional activity level. 15 min Therapeutic Activity:  -  See flow sheet :   Rationale: improve coordination and increase proprioception  to improve the patients ability to return to her normal ADLs. 20 min Neuromuscular Re-education:  []  See flow sheet :   Rationale: increase strength, improve coordination and increase proprioception  to improve the patients ability to restore her normal ability to perform her functional activities. 10 min Manual Therapy:  C/S LAD, manual stretches, TPR/DTM   Rationale: decrease pain, increase ROM and increase tissue extensibility to enable patient to return to her normal function with good mobility and less pain. With   [] TE   [] TA   [] neuro   [] other: Patient Education: [x] Review HEP    [] Progressed/Changed HEP based on:   [] positioning   [] body mechanics   [] transfers   [] heat/ice application    [] other:      Other Objective/Functional Measures:   Patient has palpable trigger points in both right and left UT.   Passively she has good ROM in supine. Pain Level (0-10 scale) post treatment: 0-/10    ASSESSMENT/Changes in Function:   Overall, the patient is feeling better, less pain. She has been able to tolerate increased activity preparing for her move to [facility]. Patient will continue to benefit from skilled PT services to modify and progress therapeutic interventions, address functional mobility deficits, address ROM deficits, address strength deficits and analyze and address soft tissue restrictions to attain remaining goals. [x]  See Plan of Care  []  See progress note/recertification  []  See Discharge Summary         Progress towards goals / Updated goals:  1. Pt will increase FOTO score to 72 points to improve ability to perform ADLs. PN: progressing, 61 points   2. Pt will report a decrease in at at best pain in the neck to 0/10 to improve tolerance to work tasks. PN: 1-2/10 at best  3. Pt will increase AROM c/s flex to 35 degs, EXT to 40 degs, B SB to 25 degs, right rotation to [facility] improve ability to perform household activities with less pain. PN: flex 25 degs with pulling at the back of the skull, EXT 32 degs, right SB 24 degs with stretching on the left neck, left SB 23 degs with tightness on the right neck, right rotation 64 degs with pulling in the right neck/jaw  4. Pt will report being able to look down for 10 mins with minimal to no increased neck pain to improve ability to perform work activities with less pain.   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

## 2018-01-17 ENCOUNTER — HOSPITAL ENCOUNTER (OUTPATIENT)
Dept: PHYSICAL THERAPY | Age: 43
Discharge: HOME | End: 2018-01-17
Payer: COMMERCIAL

## 2018-01-17 PROCEDURE — 97112 NEUROMUSCULAR REEDUCATION: CPT | Performed by: PHYSICAL THERAPIST

## 2018-01-17 PROCEDURE — 97530 THERAPEUTIC ACTIVITIES: CPT | Performed by: PHYSICAL THERAPIST

## 2018-01-17 PROCEDURE — 97140 MANUAL THERAPY 1/> REGIONS: CPT | Performed by: PHYSICAL THERAPIST

## 2018-01-17 PROCEDURE — 97110 THERAPEUTIC EXERCISES: CPT | Performed by: PHYSICAL THERAPIST

## 2018-01-17 NOTE — PROGRESS NOTES
PT DAILY TREATMENT NOTE     Patient Name: Juan Fu  Date:2018  : 1975  [x]  Patient  Verified  Payor: Gerardo Espinoza / Plan: Carry Indra Calhounprimitivoricky 78 PPO / Product Type: Commerical /    In time:2:43  Out time:3:36  Total Treatment Time (min): 53  Visit #: 3 of 10    Treatment Area: Neck pain [M54.2]    SUBJECTIVE  Pain Level (0-10 scale): 0  Any medication changes, allergies to medications, adverse drug reactions, diagnosis change, or new procedure performed?: [x] No    [] Yes (see summary sheet for update)  Subjective functional status/changes:   [] No changes reported  Patient notes some stiffness and \"pulling\" down her back with forward flexion of the head. She notes that she is not having much pain at this time. She remains active as she is preparing for a family move to [facility]. OBJECTIVE    15 min Therapeutic Exercise:  [x] See flow sheet :   Rationale: increase ROM and increase strength to improve the patients ability to increase her level of activity    15 min Therapeutic Activity:  [x]  See flow sheet :   Rationale: improve coordination and increase proprioception  to improve the patients ability to return to her normal ADLs     10 min Neuromuscular Re-education:  [x]  See flow sheet :   Rationale: improve coordination and increase proprioception  to improve the patients ability to restore her normal fictional activities. 12 min Manual Therapy:  LAD, manual stretching   Rationale: increase ROM and increase tissue extensibility to reduce pain and improve mobility. With   [] TE   [] TA   [] neuro   [] other: Patient Education: [x] Review HEP    [] Progressed/Changed HEP based on:   [] positioning   [] body mechanics   [] transfers   [] heat/ice application    [] other:      Other Objective/Functional Measures:   AROM of the C/S (sitting):  Flexion: 30 degrees;  Extension 50 degrees; R SB: 36 degrees; L SB: 21 degrees; R Rot: 56 degrees; L Rot 59 degrees. She has pulling sensations with forward flexion and left side bend. Pain Level (0-10 scale) post treatment: 0    ASSESSMENT/Changes in Function:   Patient has improved ROM overall in her C/S. She has decreased pain. Patient will continue to benefit from skilled PT services to modify and progress therapeutic interventions, address functional mobility deficits and address ROM deficits to attain remaining goals. [x]  See Plan of Care  []  See progress note/recertification  []  See Discharge Summary         Progress towards goals / Updated goals:  1. Pt will increase FOTO score to 72 points to improve ability to perform ADLs. PN: progressing, 61 points   2. Pt will report a decrease in at at best pain in the neck to 0/10 to improve tolerance to work tasks. PN: 1-2/10 at best  3. Pt will increase AROM c/s flex to 35 degs, EXT to 40 degs, B SB to 25 degs, right rotation to [facility] improve ability to perform household activities with less pain. PN: flex 25 degs with pulling at the back of the skull, EXT 32 degs, right SB 24 degs with stretching on the left neck, left SB 23 degs with tightness on the right neck, right rotation 64 degs with pulling in the right neck/jaw  4. Pt will report being able to look down for 10 mins with minimal to no increased neck pain to improve ability to perform work activities with less pain.   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

## 2018-01-18 ENCOUNTER — APPOINTMENT (OUTPATIENT)
Dept: PHYSICAL THERAPY | Age: 43
End: 2018-01-18
Payer: COMMERCIAL

## 2018-01-22 ENCOUNTER — HOSPITAL ENCOUNTER (OUTPATIENT)
Dept: PHYSICAL THERAPY | Age: 43
Discharge: HOME | End: 2018-01-22
Payer: COMMERCIAL

## 2018-01-22 PROCEDURE — 97112 NEUROMUSCULAR REEDUCATION: CPT | Performed by: PHYSICAL THERAPIST

## 2018-01-22 PROCEDURE — 97530 THERAPEUTIC ACTIVITIES: CPT | Performed by: PHYSICAL THERAPIST

## 2018-01-22 PROCEDURE — 97110 THERAPEUTIC EXERCISES: CPT | Performed by: PHYSICAL THERAPIST

## 2018-01-22 NOTE — PROGRESS NOTES
PT DAILY TREATMENT NOTE     Patient Name: Dipesh Favor  Date:2018  : 1975  [x]  Patient  Verified  Payor: Genevieve Pierre / Plan: Carry Indra Oconnor 78 PPO / Product Type: Commerical /    In time:1:35  Out time:2:25  Total Treatment Time (min): 50  Visit #: 4 of 10    Treatment Area: Neck pain [M54.2]    SUBJECTIVE  Pain Level (0-10 scale): 0  Any medication changes, allergies to medications, adverse drug reactions, diagnosis change, or new procedure performed?: [x] No    [] Yes (see summary sheet for update)  Subjective functional status/changes:   [] No changes reported  Patient notes that she is sore after working out in PT and with her activities at home during this impending move. OBJECTIVE    25 min Therapeutic Exercise:  [] See flow sheet :   Rationale: increase ROM and increase strength to improve the patients ability to perform her normal functional activities. 10 min Therapeutic Activity:  []  See flow sheet :   Rationale: improve coordination and increase proprioception  to improve the patients ability to safely and effectively perform ADLs. 20 min Neuromuscular Re-education:  []  See flow sheet :   Rationale: increase strength, improve coordination and increase proprioception  to improve the patients ability to return to normal function. With   [] TE   [] TA   [] neuro   [] other: Patient Education: [x] Review HEP    [] Progressed/Changed HEP based on:   [] positioning   [] body mechanics   [] transfers   [] heat/ice application    [] other:      Other Objective/Functional Measures:   AROM remains limited due pullling and stretching. PROM in supine is WFL. Rot and SB are equal bilaterally. Pain Level (0-10 scale) post treatment: 0    ASSESSMENT/Changes in Function:   Patient is not having pain issues but her limitations are due to tightness and stiffness of the neck and shoulder musculature.   Patient will continue to benefit from skilled PT services to modify and progress therapeutic interventions, address functional mobility deficits, analyze and address soft tissue restrictions, analyze and cue movement patterns, analyze and modify body mechanics/ergonomics and assess and modify postural abnormalities to attain remaining goals. [x]  See Plan of Care  []  See progress note/recertification  []  See Discharge Summary         Progress towards goals / Updated goals:  1. Pt will increase FOTO score to 72 points to improve ability to perform ADLs. PN: progressing, 61 points   2. Pt will report a decrease in at at best pain in the neck to 0/10 to improve tolerance to work tasks. PN: 1-2/10 at best  3. Pt will increase AROM c/s flex to 35 degs, EXT to 40 degs, B SB to 25 degs, right rotation to [facility] improve ability to perform household activities with less pain. PN: flex 25 degs with pulling at the back of the skull, EXT 32 degs, right SB 24 degs with stretching on the left neck, left SB 23 degs with tightness on the right neck, right rotation 64 degs with pulling in the right neck/jaw  4. Pt will report being able to look down for 10 mins with minimal to no increased neck pain to improve ability to perform work activities with less pain.   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

## 2018-01-25 ENCOUNTER — HOSPITAL ENCOUNTER (OUTPATIENT)
Dept: PHYSICAL THERAPY | Age: 43
Discharge: HOME | End: 2018-01-25
Payer: COMMERCIAL

## 2018-01-25 PROCEDURE — 97112 NEUROMUSCULAR REEDUCATION: CPT | Performed by: PHYSICAL THERAPIST

## 2018-01-25 PROCEDURE — 97530 THERAPEUTIC ACTIVITIES: CPT | Performed by: PHYSICAL THERAPIST

## 2018-01-25 PROCEDURE — 97140 MANUAL THERAPY 1/> REGIONS: CPT | Performed by: PHYSICAL THERAPIST

## 2018-01-25 PROCEDURE — 97110 THERAPEUTIC EXERCISES: CPT | Performed by: PHYSICAL THERAPIST
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
## 2018-01-25 NOTE — PROGRESS NOTES
PT DAILY TREATMENT NOTE 12    Patient Name: Lawanda Coburn  Date:2018  : 1975  [x]  Patient  Verified  Payor: Felipa Ruiz / Plan: Carry Indra Porrasmerly 78 PPO / Product Type: Commerical /    In time:9:30  Out time:10:15  Total Treatment Time (min): 75  Visit #: 5 of 10    Treatment Area: Neck pain [M54.2]    SUBJECTIVE  Pain Level (0-10 scale): 0  Any medication changes, allergies to medications, adverse drug reactions, diagnosis change, or new procedure performed?: [x] No    [] Yes (see summary sheet for update)  Subjective functional status/changes:   [] No changes reported  Patient reports no pain in her neck. She has not been doing much reading so has not had to spend much time looking down but she feels that it is improved since her initial evaluation. She did develop some right lateral elbow pain during her moving and packing this week. OBJECTIVE    15 min Therapeutic Exercise:  [] See flow sheet :   Rationale: increase ROM and increase strength to improve the patients ability to return to her normal activity. 15 min Therapeutic Activity:  []  See flow sheet :   Rationale: improve coordination and increase proprioception  to improve the patients ability to safely perform her normal activities. 15 min Neuromuscular Re-education:  []  See flow sheet :   Rationale: increase strength, improve coordination and increase proprioception  to improve the patients ability to return to her normal functional activities. 10 min Manual Therapy:  LAD, manual traction, stretching UT, scalene, levator scapulae. Rationale: increase ROM and increase tissue extensibility to enable patient to have increased ROM so patient may safely perform her ADLs.           With   [] TE   [] TA   [] neuro   [] other: Patient Education: [x] Review HEP    [] Progressed/Changed HEP based on:   [] positioning   [] body mechanics   [] transfers   [] heat/ice application    [] other:      Other Objective/Functional Measures:   AROM C/S Flex: 0-30; Ext 0-80; R SB 0-32; L SB 0-28; R Rot 70; L Rot 70 degrees. MMT:  C4-T1:  No deficits in strength. Palpation:  No c/o tenderness over the suboccipital muscles, scaleni, SCM, UT or levator scapulae muscles. Pain Level (0-10 scale) post treatment: 0    ASSESSMENT/Changes in Function:   Patient has no pain and is able to do her normal activities. She still lacks some AROM of the C/S and there is tightness with side bend and flexion. Patient is moving to [facility] and will be discharged from our care at this time. []  See Plan of Care  []  See progress note/recertification  [x]  See Discharge Summary         Progress towards goals / Updated goals:  1. Pt will increase FOTO score to 72 points to improve ability to perform ADLs. PN: progressing, 72 points, 1/25/18   2. Pt will report a decrease in at best pain in the neck to 0/10 to improve tolerance to work tasks. PN: 1-2/10 at best  3. Pt will increase AROM c/s flex to 35 degs, EXT to 40 degs, B SB to 25 degs, right rotation to [facility] improve ability to perform household activities with less pain. PN: flex 25 degs with pulling at the back of the skull, EXT 32 degs, right SB 24 degs with stretching on the left neck, left SB 23 degs with tightness on the right neck, right rotation 64 degs with pulling in the right neck/jaw  4. Pt will report being able to look down for 10 mins with minimal to no increased neck pain to improve ability to perform work activities with less pain. 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 []  Other:_      Jesus Alberto Rodriguez, PT 1/25/2018  9:37 AM    No future appointments.

## 2024-06-07 NOTE — PROGRESS NOTES
Left message informing pt   PT DAILY TREATMENT NOTE - [facility]     Patient Name: Ricky Taylor  Date:2017  : 1975  [x]  Patient  Verified  Payor: Clarke Monge / Plan: Bernadette Gambino / Product Type: Commerical /    In time:2:10  Out time:2:55  Total Treatment Time (min): 45  Visit #: 1 of 10    Treatment Area: Neck pain [M54.2]    SUBJECTIVE  Pain Level (0-10 scale): 5  Any medication changes, allergies to medications, adverse drug reactions, diagnosis change, or new procedure performed?: [x] No    [] Yes (see summary sheet for update)  Subjective functional status/changes:   [] No changes reported  See POC    OBJECTIVE    35 min [x]Eval                  []Re-Eval     10 min Therapeutic Exercise:  [] See flow sheet : HEP instruction and demonstration, pt education regarding anatomy and physiology of the spine and how it relates to the pt's condition. Rationale: increase ROM and increase strength to improve the patients ability to tolerate ADLs          With   [] TE   [] TA   [] neuro   [] other: Patient Education: [x] Review HEP    [] Progressed/Changed HEP based on:   [] positioning   [] body mechanics   [] transfers   [] heat/ice application    [] other:      Other Objective/Functional Measures: See evaluation. Pain Level (0-10 scale) post treatment: 0    ASSESSMENT/Changes in Function: Pt given HEP handout to perform. Pt understood exercises in HEP handout. Pt demonstrated decreased AROM, pain with MMT, tenderness to palpation, and muscle tightness. Pt would benefit from physical therapy to improve the above impairments to help the pt return to performing ADLs, functional and work activities.      Patient will continue to benefit from skilled PT services to modify and progress therapeutic interventions, address functional mobility deficits, address ROM deficits, address strength deficits, analyze and address soft tissue restrictions, analyze and cue movement patterns, analyze and modify body mechanics/ergonomics, assess and modify postural abnormalities and instruct in home and community integration to attain remaining goals. [x]  See Plan of Care  []  See progress note/recertification  []  See Discharge Summary         Progress towards goals / Updated goals:  Short Term Goals: To be accomplished in 2 weeks:  1. Pt will report compliance and independence to HEP to help the pt manage their pain and symptoms. Eval: established             Long Term Goals: To be accomplished in 5 weeks:  1. Pt will increase FOTO score to 72 points to improve ability to perform ADLs. Eval: 59 points  2. Pt will report a decrease in at worst pain to 5/10 to improve tolerance to work tasks. Eval: 8/10 at worst   3. Pt will increase AROM c/s flex to 35 degs, EXT to 40 degs, B SB to 25 degs, right rotation to 60 degs, left rotation to WNLs to improve ability to perform household activities with less pain. Eval: flex 22 degs with pain on right neck, EXT 28 degs with tightness right skull region, right SB 19 degs with tightness right skull region, left SB 21 degs, right rotation 48 degs with pain in the right neck, left rotation 62 degs. 4. Pt will report being able to look down for 10 mins with minimal to no increased neck pain to improve ability to perform work activities with less pain.   Eval: ~ 5 mins tolerance    PLAN  [x]  Upgrade activities as tolerated     [x]  Continue plan of care  [x]  Update interventions per flow sheet       []  Discharge due to:_  []  Other:_      Pascal Spurling, PT 11/28/2017  2:59 PM    Future Appointments  Date Time Provider Hannah Palencia   11/28/2017 2:00 PM Pascal Spurling, PT [facility]